# Patient Record
(demographics unavailable — no encounter records)

---

## 2024-10-25 NOTE — HISTORY OF PRESENT ILLNESS
[de-identified] : 38F FV Leiden carrier presents to establish care. Pt feels well overall. Reports being diagnosed as a carrier ~2012 after father found to have VTE s/p small procedure. Pt denies hx of personal VTEs. Reports prolonged bruising after injuries lasting > 2 months. Denies easy bleeding. Reports regular menses; 1 week 3-4 tampons/ day (heavier in the beginning). Previous hx of anxiety and CP. Denies dizziness, LOC, CP, SOB, abdominal pain. No melena, hematochezia. Had been on aspirin previously, now off. No hx of pregnancy or planned pregnancies. No hx of hormone therapies.   Surgeries: turbinate reduction Social Hx: no tobacco use, social alcohol use;  Medical hz: F5 carrier Family hx: paternal grandma (breast cancer) Allergies: codeine (throat itching) Meds: claritin D PRN, Advil PRN, pepcid PRN  No major hospitalizations

## 2024-10-25 NOTE — ASSESSMENT
[FreeTextEntry1] : 38F FV Leiden carrier presents to establish care.   # Factor V Leiden carrier Pt feels well overall. Reports being diagnosed as a carrier ~2012 after father found to have VTE s/p small procedure. - Will obtain w/u today and reach out to Dr. Rider for complete labs  - labs drawn: Antithrombin 3, Protein C, Protein S, FV, prothrombin - Cariers generally do not require prophylactic anticoagulants or antiplatelet agents unless there is a clinical indication such as an acute medical illness or surgery for which routine thromboprophylaxis is indicated - counseled on RFs and signs/symptoms VTEs  Pt to f/u via telehealth in 1 week